# Patient Record
(demographics unavailable — no encounter records)

---

## 2020-02-12 NOTE — DIAGNOSTIC IMAGING REPORT
INDICATION: Fall with left hip pain.



AP and oblique views of the left hip are obtained.



FINDINGS: No fracture or acute bony abnormality is seen. There is

severe osteoarthritic change of the left hip with severe joint

space narrowing with bone-on-bone contact, subchondral sclerosis,

and femoral head deformity.



IMPRESSION:



Severe osteoarthritic change of left hip as described above. No

acute abnormality.



Dictated by: 



  Dictated on workstation # [id number]

## 2020-02-12 NOTE — DIAGNOSTIC IMAGING REPORT
INDICATION: Left knee pain.



AP, oblique, and lateral views of the left knee are obtained.



FINDINGS: No fracture or acute bony abnormality is seen. There is

some patellofemoral spurring and joint space narrowing. There is

mild medial and lateral joint space narrowing.



IMPRESSION:



Degenerative findings of the left knee with no acute-appearing

bony abnormality.



Dictated by: 



  Dictated on workstation # [id number]

## 2020-02-12 NOTE — DIAGNOSTIC IMAGING REPORT
INDICATION: Wheezing.



TECHNIQUE: Frontal chest obtained at 12:44 p.m.



FINDINGS: Heart is borderline in size. Mediastinal silhouette is

unremarkable. There is some minimal infiltrate versus scarring in

the right lung base. There is no pneumothorax or pleural fluid.



IMPRESSION: Minimal infiltrate versus scarring in right lung

base. We do not have prior studies for comparison. There is no

definitive consolidation or pneumothorax or pleural fluid.



Dictated by: 



  Dictated on workstation # [id number]

## 2020-03-03 NOTE — DIAGNOSTIC IMAGING REPORT
INDICATION: 

COPD and shortness of breath.



TECHNIQUE/COMPARISON: 

PA and lateral films of the chest were obtained at 4:39 PM and

compared to 02/12/2020.



FINDINGS:

The heart is top limits of normal in size. The mediastinal

silhouette is unremarkable. The lungs show no focal infiltrate.

There is no pneumothorax or pleural fluid. Chronic changes in the

thoracic spine are noted.



IMPRESSION:

Borderline heart size with no acute process in the chest. No

significant change from 02/12/2020.



Dictated by: 



  Dictated on workstation # [id number]

## 2020-03-13 NOTE — DIAGNOSTIC IMAGING REPORT
PROCEDURE: CT head and CT cervical spine without contrast.



TECHNIQUE: Multiple contiguous axial images were obtained through

the brain and cervical spine without the use of intravenous

contrast. Sagittal and coronal reformations through the cervical

spine were then performed. Auto Exposure Controls were utilized

during the CT exam to meet ALARA standards for radiation dose

reduction. 



INDICATION:  Fall. Scalp contusion. Head and neck pain.



COMPARISON: None.



FINDINGS: 

CT head: No large acute territorial ischemia, mass, or

hemorrhage. Chronic microvascular disease is seen in the

periventricular and subcortical white matter. The ventricles and

cortical sulci are prominent, consistent with generalized volume

loss. The basilar cisterns are patent and unremarkable. 



The calvarium is intact. Retained secretions are seen in the

maxillary sinuses. The mastoid air cells are well pneumatized.



CT cervical spine: A fracture is visualized involving the base of

the dens. There is approximately 30 degrees of angulation of the

fracture fragment posteriorly into the spinal canal. No

significant spinal canal stenosis is seen. No other fractures are

visualized in the cervical spine. Multilevel degenerative changes

are present in the cervical spine with marginal osteophytes, disc

height loss and desiccation, and uncovertebral arthropathy. The

soft tissues of the neck are unremarkable. The included lung

apices are clear.



IMPRESSION:  

1. No hemorrhage or focal intra-axial mass.  No CT evidence of

large acute territorial ischemia.  

2. Acute fracture involving the base of the dens, representing

type II dens fracture. There is associated 30 degrees of

posterior angulation of the fracture fragment without significant

spinal canal stenosis.

3. Chronic microvascular disease with generalized parenchymal

volume loss.

4. Multilevel degenerative changes in the cervical spine.



Findings were discussed with Madi Horta at 4:27 PM on 03/13/2020

by Dr. Reagan Main.



Dictated by: 



  Dictated on workstation # [id number]

## 2020-03-13 NOTE — DIAGNOSTIC IMAGING REPORT
INDICATION: Pain status post fall with injury. 



COMPARISON: None.



FINDINGS: Three views of the left hand were obtained and show no

fractures, dislocations, or other acute bony abnormalities.

Moderate osteoarthritic changes are noted, particularly involving

the first carpometacarpal joint space. Otherwise, joint spaces

are maintained. The soft tissues appear unremarkable. No

radiopaque foreign bodies are identified.



IMPRESSION: Moderate osteoarthritic changes, but no evidence of

acute fracture or dislocation of the left hand.



Dictated by: 



  Dictated on workstation # [id number]

## 2020-03-13 NOTE — ED FALL/INJURY
General


Stated Complaint:  FELL


Source:  patient


Exam Limitations:  no limitations





History of Present Illness


Date Seen by Provider:  Mar 13, 2020


Time Seen by Provider:  15:47


Initial Comments


To ER from [facility] with reports of a fall. He reached 

up to grab something and fell. Struck the center of his forehead. No loss of 

consciousness, skin tear to the dorsal aspect left hand. He is on aspirin but no

other antiplatelet or anticoagulant medications.


Occurred:  just prior to arrival


Severity:  moderate


Associated Symptoms (Fall):  Neck Pain





Allergies and Home Medications


Allergies


Coded Allergies:  


     No Known Drug Allergies (Unverified , 3/13/20)





Patient Home Medication List


Home Medication List Reviewed:  Yes





Review of Systems


Review of Systems


Constitutional:  see HPI


Eyes:  No Symptoms Reported


Ears, Nose, Mouth, Throat:  no symptoms reported


Respiratory:  no symptoms reported


Genitourinary:  no symptoms reported


Musculoskeletal:  no symptoms reported


Skin:  no symptoms reported


Psychiatric/Neurological:  No Symptoms Reported





Past Medical-Social-Family Hx


Patient Social History


Recent Foreign Travel:  No


Contact w/Someone Who Travel:  No





Physical Exam


Vital Signs





Vital Signs - First Documented








 3/13/20





 15:37


 


Temp 35.8


 


Pulse 74


 


Resp 15


 


B/P (MAP) 113/90 (98)


 


Pulse Ox 97


 


O2 Delivery Nasal Cannula


 


O2 Flow Rate 3.00





Capillary Refill :


Height, Weight, BMI


Height: '"


Weight: lbs. oz. kg;  BMI


Method:


General Appearance:  WD/WN, no apparent distress


HEENT:  PERRL/EOMI, normal ENT inspection, other (abrasion to the center of his 

forehead, superficial skin tears to the dorsal knuckles left hand)


Neck:  non-tender, full range of motion


Respiratory:  lungs clear, normal breath sounds, no respiratory distress, no 

accessory muscle use


Gastrointestinal:  normal bowel sounds, non tender, soft


Extremities:  normal range of motion, non-tender


Neurologic/Psychiatric:  alert, normal mood/affect, oriented x 3


Skin:  normal color, warm/dry


Because of body habitus was unable to get a rigid cervical collar or a soft 

cervical collar to fit him, instead we rolled up towels and taped these in 

place.





[place] Coma Score


Best Eye Response:  (4) Open Spontaneously


Best Verbal Response:  (5) Oriented


Best Motor Response:  (6) Obeys Commands


Tuan Total:  15





Progress/Results/Core Measures


Results/Orders


My Orders





Orders - SARBJIT OLVERA


Ct Head/Cervical Spine Wo (3/13/20 15:43)


Hand, Left, 3 Views (3/13/20 15:43)





Vital Signs/I&O











 3/13/20 3/13/20





 15:37 18:33


 


Temp 35.8 


 


Pulse 74 76


 


Resp 15 15


 


B/P (MAP) 113/90 (98) 129/62 (98)


 


Pulse Ox 97 97


 


O2 Delivery Nasal Cannula Nasal Cannula


 


O2 Flow Rate 3.00 3.00











Departure


Communication (Admissions)


Patient is also at [facility] and rehabilitation under skilled status, he 

has about 40 days left, patient's children are concerned about disposition once 

those days have . We'll write a consult for  to evaluate 

for permanent nursing home placement.


Spoke with the patient's nurse at the nursing home, will write an order to 

continue physical therapy and occupational therapy as tolerated, up with 

assistance only--she states that he is already up with assistance only. We will 

have him follow-up with Dr. Chávez in the upcoming weeks, he'll be sent home 

with an Aspen collar which is in place currently. This decision was made after 

discussing with Dr. Chávez the images, they were reviewed by him as well. He 

states that the anterior approcah for screw placement would be difficult given 

the patient's degree of degenerative changes in the neck. If anything surgical 

were to be done it would need to be a posterior occipito-cervical fusion wiht 

plate from the base of the skull to the C1 to C3 range. I discussed this with 

the patient, Dr. Chávez advised that that procedure would need to be done at 

[facility] if the patient desired surgical repair. However the patient

and his adult son at the bedside agree with just a semirigid collar and 

nonoperative management at this point. He denies any paresthesias tingling or 

numbness. States the pain in his neck is "not bad".





Impression





   Primary Impression:  


   Closed dens fracture


   Qualified Codes:  S12.100A - Unspecified displaced fracture of second 

   cervical vertebra, initial encounter for closed fracture


   Additional Impression:  


   Skin abrasion


Disposition:   HOME, SELF-CARE


Condition:  Stable





Departure-Patient Inst.


Decision time for Depature:  17:28


Referrals:  


NO,LOCAL PHYSICIAN (PCP)


Primary Care Physician


Patient Instructions:  Cervical Immobilizers





Add. Discharge Instructions:  


1. Wear the collar at all times until you follow-up with Dr. Chávez and then 

wear the collar per his discretion. Call his office on Monday to make an 

appointment to be seen in the upcoming few weeks whenever he can get you in (he 

is usually booked out many weeks which is oK)Address: 


Dr Elvis Chávez [facility], [address] [place] [address], [address] [place] [address]


Hours: 


Closed ? Opens 8AM Mon











Phone: [phone]





Copy


Copies To 1:   CRISTOPHER SUAREZ PETER J APRN             Mar 13, 2020 15:49

## 2020-03-15 NOTE — XMS REPORT
Continuity of Care Document

                             Created on: 03/15/2020



DEAL, ONES

External Reference #: [id number]

: 1934

Sex: Male



Demographics





                          Address                   [address] [place]  [address]

 

                          Home Phone                [phone] x

 

                          Preferred Language        Unknown

 

                          Marital Status            Unknown

 

                          Zoroastrian Affiliation     Unknown

 

                          Race                      Unknown

 

                          Ethnic Group              Unknown





Author





                          Organization              Unknown

 

                          Address                   Unknown

 

                          Phone                     Unavailable



              



Allergies

      



There is no data.                  



Medications

      



There is no data.                  



Problems

      



             Date Dx Coded           Attending           Type           Code    

       

Diagnosis                               Diagnosed By        

 

                2020           CRISTOPHER SUAREZ DO, Ot       

       J44.9       

                          CHRONIC OBSTRUCTIVE PULMONARY DISEASE, U              

      

 

                2020           CRISTOPHER SUAREZ DO           Ot       

       M16.12      

                          UNILATERAL PRIMARY OSTEOARTHRITIS, LEFT               

      

 

                2020           CRISTOPHER SUAREZ DO, Ot       

       M17.12      

                          UNILATERAL PRIMARY OSTEOARTHRITIS, LEFT               

      

 

                2020           CRISTOPHER SUAREZ DO, Ot       

       W19.XXXA    

                          UNSPECIFIED FALL, INITIAL ENCOUNTER                   

 

 

                2020           CRISTOPHER SUAREZ DO, Ot       

       J44.9       

                          CHRONIC OBSTRUCTIVE PULMONARY DISEASE, U              

      



                          



Procedures

      



There is no data.                  



Results

      



There is no data.              



Encounters

      



                ACCT No.           Visit Date/Time           Discharge          

 Status         

             Pt. Type           Provider           Facility           Loc./Unit 

          

Complaint        

 

                    [id number]           2020 16:14:00           

020 23:59:59        

                [facility]             Outpatient           CRISTOPHER SUAREZ DO    

       Via 

[facility]           RAD                       COPD        

 

                    [id number]           2020 12:10:00           

020 23:59:59        

                [facility]             Outpatient           CRISTOPHER SUAREZ DO    

       Via 

[facility]           RAD                       FELL HIT LEFT H

IP AND KNEE